# Patient Record
(demographics unavailable — no encounter records)

---

## 2025-07-10 NOTE — CONSULT LETTER
[Dear  ___] : Dear  [unfilled], [Consult Letter:] : I had the pleasure of evaluating your patient, [unfilled]. [Please see my note below.] : Please see my note below. [Consult Closing:] : Thank you very much for allowing me to participate in the care of this patient.  If you have any questions, please do not hesitate to contact me. [Sincerely,] : Sincerely, [FreeTextEntry3] : Arnoldo Heaton MD, FACS

## 2025-07-10 NOTE — PLAN
[FreeTextEntry1] : Ms. MASTERS  was told significance of findings, options, risks and benefits were explained.   All surgical options were discussed including non-surgical treatments.   I reviewed importance of behavioral modification. Nutrition was reviewed and reinforced, including the importance of making healthy food choices and having more fiber in the diet.  Use sitz baths.    Patient's questions and concerns addressed to patient's satisfaction.  patient will follow up in one month or if needed. Warning signs, follow up, and restrictions were discussed with the patient. Patient advised to seek immediate medical attention with any acute change in symptoms or with the development of any new or worsening symptoms.  Patient's questions and concerns addressed to patient's satisfaction, and patient verbalized an understanding of the information discussed.

## 2025-07-10 NOTE — ASSESSMENT
[FreeTextEntry1] : Impression: reported hemorrhoids- patient was not examined. Based on her complaints she has a fissure. she has symptoms only for 2 weeks.  due to the reported blood per rectum she was advised to see a GI specialist.

## 2025-07-10 NOTE — PHYSICAL EXAM
Secondary Defect Width In Cm (Required For Flaps): 0 [Alert] : alert [Oriented to Person] : oriented to person [Oriented to Place] : oriented to place [Oriented to Time] : oriented to time [Calm] : calm [de-identified] : She  is alert, well-groomed, and in NAD   [de-identified] : anicteric.  Nasal mucosa pink, septum midline. Oral mucosa pink.  Tongue midline, Pharynx without exudates.   [de-identified] : Neck supple. Trachea midline. Thyroid isthmus barely palpable, lobes not felt.   [de-identified] : deferred

## 2025-07-10 NOTE — REVIEW OF SYSTEMS
[As Noted in HPI] : as noted in HPI [Constipation] : constipation [Melena] : melroyal [Negative] : Heme/Lymph

## 2025-07-10 NOTE — HISTORY OF PRESENT ILLNESS
[de-identified] : Ms. RACHEL MASTERS  is 19 year  old  female referred by Dr. Lynn Cancino with the chief complaint having  hemorrhoids.   She reports having  this condition for 15 days.   Ms. MASTERS reports   bleeding   from the rectum. She denies  any nausea, vomiting or fevers.   Patient reports  good   appetite. Patient denies  weight loss or fatigue.  patient reports  frequent constipations.  she reports pain after having bowel movements.  Ms. MASTERS  denies family history of Colon Ca.  patient reports using laxative suppositories  but it does not help